# Patient Record
Sex: MALE | Race: WHITE | NOT HISPANIC OR LATINO
[De-identification: names, ages, dates, MRNs, and addresses within clinical notes are randomized per-mention and may not be internally consistent; named-entity substitution may affect disease eponyms.]

---

## 2017-01-10 ENCOUNTER — APPOINTMENT (OUTPATIENT)
Dept: SURGERY | Facility: CLINIC | Age: 47
End: 2017-01-10

## 2019-01-25 ENCOUNTER — EMERGENCY (EMERGENCY)
Facility: HOSPITAL | Age: 49
LOS: 1 days | Discharge: HOME | End: 2019-01-25
Attending: EMERGENCY MEDICINE
Payer: COMMERCIAL

## 2019-01-25 VITALS
WEIGHT: 195.11 LBS | HEART RATE: 57 BPM | HEIGHT: 65 IN | DIASTOLIC BLOOD PRESSURE: 84 MMHG | OXYGEN SATURATION: 98 % | TEMPERATURE: 98 F | SYSTOLIC BLOOD PRESSURE: 129 MMHG | RESPIRATION RATE: 18 BRPM

## 2019-01-25 DIAGNOSIS — Z98.89 OTHER SPECIFIED POSTPROCEDURAL STATES: Chronic | ICD-10-CM

## 2019-01-25 PROCEDURE — 90471 IMMUNIZATION ADMIN: CPT

## 2019-01-25 PROCEDURE — 96372 THER/PROPH/DIAG INJ SC/IM: CPT

## 2019-01-25 PROCEDURE — 90675 RABIES VACCINE IM: CPT

## 2019-01-25 PROCEDURE — 99283 EMERGENCY DEPT VISIT LOW MDM: CPT

## 2019-01-25 PROCEDURE — 90375 RABIES IG IM/SC: CPT

## 2019-01-25 PROCEDURE — 99283 EMERGENCY DEPT VISIT LOW MDM: CPT | Mod: 25

## 2019-01-25 RX ORDER — HUMAN RABIES VIRUS IMMUNE GLOBULIN 150 [IU]/ML
1760 INJECTION, SOLUTION INTRAMUSCULAR ONCE
Qty: 0 | Refills: 0 | Status: COMPLETED | OUTPATIENT
Start: 2019-01-25 | End: 2019-01-25

## 2019-01-25 RX ORDER — RABIES VACC, HUMAN DIPLOID/PF 2.5 UNIT
1 VIAL (EA) INTRAMUSCULAR ONCE
Qty: 0 | Refills: 0 | Status: COMPLETED | OUTPATIENT
Start: 2019-01-25 | End: 2019-01-25

## 2019-01-25 RX ADMIN — HUMAN RABIES VIRUS IMMUNE GLOBULIN 1760 UNIT(S): 150 INJECTION, SOLUTION INTRAMUSCULAR at 14:56

## 2019-01-25 RX ADMIN — Medication 1 MILLILITER(S): at 14:55

## 2019-01-25 NOTE — ED ADULT NURSE NOTE - OBJECTIVE STATEMENT
48 year old male presents ambulatory to ED through waiting room from home complaining of waking up to a bat in their bed room. no known contact with bat, no obvious wounds or bite marks. denies pain.

## 2019-01-25 NOTE — ED PROVIDER NOTE - NSFOLLOWUPINSTRUCTIONS_ED_ALL_ED_FT
Follow up in 3, 7, and 14 days from now for repeat rabies vaccinations    Return to the ER immediately for the development of any symptoms, including but not limited to fever, numbness, difficulty swallowing, or weakness.

## 2019-01-25 NOTE — ED PROVIDER NOTE - MEDICAL DECISION MAKING DETAILS
attending note. Patient awoke ith back lying in bed remnant. A prophylaxis for rabies including immunoglobulin and vaccine. attending note. Patient awoke with back lying in bed remnant. A prophylaxis for rabies including immunoglobulin and vaccine.

## 2019-01-25 NOTE — ED PROVIDER NOTE - OBJECTIVE STATEMENT
Attending note. Patient was seen in the fast track room #2 with spouse. Patient woke up last night with a bat flying around in the bedroom. There is no known reported to contact with the bat. Patient has no complaints.

## 2019-01-25 NOTE — ED PROVIDER NOTE - PMH
Seasonal allergies    Seasonal asthma  not taking any maintence inhaler states, as needed puff during the season "  Sleep apnea, obstructive  mild

## 2024-07-22 NOTE — ED ADULT NURSE NOTE - NS_NURSE_DISC_TEACHING_YN_ED_ALL_ED
FUTURE VISIT INFORMATION      FUTURE VISIT INFORMATION:  Date: 10/22/24  Time: 10:00am  Location: [facility]  REFERRAL INFORMATION:  Referring provider:  Malgorzata Crane MD   Referring providers clinic:  [facility]    RECORDS REQUESTED FROM:       Clinic name Comments Records Status Imaging Status   [facility] Ov/referral 7/22/24  Ov 7/3/24 epic       
Yes